# Patient Record
Sex: FEMALE | Race: WHITE
[De-identification: names, ages, dates, MRNs, and addresses within clinical notes are randomized per-mention and may not be internally consistent; named-entity substitution may affect disease eponyms.]

---

## 2017-09-19 ENCOUNTER — HOSPITAL ENCOUNTER (OUTPATIENT)
Dept: HOSPITAL 80 - FIMAGING | Age: 77
End: 2017-09-19
Attending: INTERNAL MEDICINE
Payer: COMMERCIAL

## 2017-09-19 DIAGNOSIS — Z12.31: Primary | ICD-10-CM

## 2017-09-19 PROCEDURE — G0202 SCR MAMMO BI INCL CAD: HCPCS

## 2017-10-06 ENCOUNTER — HOSPITAL ENCOUNTER (OUTPATIENT)
Dept: HOSPITAL 80 - FIMAGING | Age: 77
End: 2017-10-06
Attending: INTERNAL MEDICINE
Payer: COMMERCIAL

## 2017-10-06 DIAGNOSIS — K70.30: Primary | ICD-10-CM

## 2018-04-24 ENCOUNTER — HOSPITAL ENCOUNTER (INPATIENT)
Dept: HOSPITAL 80 - F3N | Age: 78
LOS: 1 days | Discharge: HOME | DRG: 470 | End: 2018-04-25
Attending: ORTHOPAEDIC SURGERY | Admitting: ORTHOPAEDIC SURGERY
Payer: COMMERCIAL

## 2018-04-24 DIAGNOSIS — M16.11: Primary | ICD-10-CM

## 2018-04-24 DIAGNOSIS — E78.00: ICD-10-CM

## 2018-04-24 DIAGNOSIS — E03.9: ICD-10-CM

## 2018-04-24 DIAGNOSIS — Z96.611: ICD-10-CM

## 2018-04-24 DIAGNOSIS — Z96.652: ICD-10-CM

## 2018-04-24 DIAGNOSIS — Z96.642: ICD-10-CM

## 2018-04-24 PROCEDURE — C1713 ANCHOR/SCREW BN/BN,TIS/BN: HCPCS

## 2018-04-24 PROCEDURE — 0SR902Z REPLACEMENT OF RIGHT HIP JOINT WITH METAL ON POLYETHYLENE SYNTHETIC SUBSTITUTE, OPEN APPROACH: ICD-10-PCS | Performed by: ORTHOPAEDIC SURGERY

## 2018-04-24 RX ADMIN — ACETAMINOPHEN SCH: 325 TABLET ORAL at 13:37

## 2018-04-24 RX ADMIN — ACETAMINOPHEN SCH MG: 325 TABLET ORAL at 17:34

## 2018-04-24 RX ADMIN — FAMOTIDINE SCH MG: 20 TABLET, FILM COATED ORAL at 20:44

## 2018-04-24 RX ADMIN — ACETAMINOPHEN SCH MG: 325 TABLET ORAL at 23:15

## 2018-04-24 RX ADMIN — DOCUSATE SODIUM AND SENNOSIDES SCH TAB: 50; 8.6 TABLET ORAL at 20:44

## 2018-04-24 RX ADMIN — ASPIRIN SCH MG: 325 TABLET, FILM COATED ORAL at 20:50

## 2018-04-24 NOTE — POSTOPPROG
Post Op Note


Date of Operation: 04/24/18


Surgeon: Washington Denny


Assistant: Mariano


Anesthesiologist: Ian


Anesthesia: IV Sedation, Spinal


Post-op Diagnosis: Right hip severe degenerative arthritis


Procedure: Right total hip arthroplasty


Inf/Abcess present in the surg proc area at time of surgery?: No


EBL: 100-500

## 2018-04-24 NOTE — GOP
[f rep st]



                                                                OPERATIVE REPORT





DATE OF OPERATION:  04/24/2018



SURGEON:  Washington Denny MD



ASSISTANT:  SIOMARA Tan CFA.



ANESTHESIA:  A combination of Marcaine, spinal, IV sedation, and light general anesthesia.



ANESTHESIOLOGIST:  Dr. Blade Sosa.



PREOPERATIVE DIAGNOSIS:  Right hip severe degenerative arthritis.



POSTOPERATIVE DIAGNOSIS:  Right hip severe degenerative arthritis.



PROCEDURE PERFORMED:  04/24/2018, right total hip arthroplasty, cobalt chrome femoral head on highly 
cross-linked polyethylene cup liner.



FINDINGS:  





ESTIMATED BLOOD LOSS:  400 mL.   



The sponge and needle count were correct on 2 occasions.



DESCRIPTION OF PROCEDURE:  The patient was given 1 g of IV vancomycin preoperatively within 60 minute
s of surgery.  She also received IV tranexamic acid at a dose of 1000 mg.  She was placed on the oper
ating room table and given spinal anesthesia with Marcaine by Dr. Sosa.  She was then placed supi
ne and given IV sedation.  A Price catheter was not used.  She wore a ADRIENNE stocking on the nonoperativ
e leg.  Because she has lymphedema in both upper extremities, the blood pressure cuff was placed on h
er left calf.  I could not use an SCD on the left leg.  She was rolled to the left lateral decubitus 
position.  The position was secured with the pegboard table attachment.  An axillary roll was used, a
nd all pressure points were carefully padded.  She has bilateral total shoulder arthroplasties.  I wa
s very careful in positioning her and padding her shoulders.  I was careful to lock her pelvis in a r
igid vertical position.  Her perineum was isolated with plastic adhesive drapes.  Her right hip and r
ight lower extremity were prepped with ChloraPrep.  They were draped free using sterile sheets, stock
inette, and Ioban plastic drapes. 



The [employer] time-out was performed to verify the correct surgical side and site and
 the correct patient identity.  The Emblem time-out was also performed. 



I made a 5-inch straight oblique posterolateral hip skin incision.  Subcutaneous tissues were sharply
 divided, and hemostasis was obtained using electrocautery.  The fascia erik was identified and split
 along the axis of its fibers.  I then curved posteriorly and proximally, and split the fascia of glu
teus kiah and bluntly split the muscle fibers in line with their orientation.  The Charnley self-r
etaining retractor was inserted.  Her sciatic nerve was located, partially exposed, and protected thr
oughout the procedure.  The external rotators and the posterior hip capsule were divided as separate 
layers at the base of the femoral neck, tagged, and reflected posteriorly.  A smooth 8-inch Steinmann
 pin was inserted vertically into the ilium superior to the acetabulum.  An 1/8-inch drill bit was in
serted vertically into the greater trochanter and parallel to the first pin.  The distance between th
e two was measured for leg length reference.  Her femoral head was dislocated posteriorly.  Severe de
generative changes were present on the femoral head.  The femoral neck was osteotomized at the approp
riate level and inclination. 



I was careful to preserve all the posterior capsule and most of the anterior capsule.  The remnant of
 her badly damaged labrum was excised. 



I prepared the femur first.  This allowed me to  the amount of natural femoral neck anteversion.
  This, in turn, allowed me to later determine the correct amount of cup anteversion.  She had approx
imately 10 or 12 degrees of natural femoral neck anteversion.  The canal was opened laterally with a 
box chisel.  I reamed and broached sequentially up to size 3.  I used a More Accolade II stem and 
size 3 broach with high offset as a trial stem.  I was careful to lateralize adequately. 



Appropriate retractors were inserted to expose the acetabulum.  The acetabulum was reamed sequentiall
y up to 51 mm.  I selected a 52 mm Marianna Tritanium cluster hole hemispherical shell.  This was marko
ed securely into place in the proper degree of inclination and anteversion.  I used the transverse ac
etabular ligament and other acetabular bony landmarks to help me properly orient the cup.  I inserted
 30 mm and a 25 mm supplemental screws through the shell for supplemental fixation.  I also inserted 
a screw-in metal dome hole plug.  Small anterior osteophytes were removed with an osteotome and ronge
ur. 



I performed a series of trial reductions to determine length and stability.  I concluded that the siz
e 3 stem with a +4 mm neck with high offset and a 32 mm head with a flush liner gave me the proper co
mbination of appropriate length and good anterior and posterior stability. 



The 0-degree More X3 highly cross-linked polyethylene liner was inserted and tapped securely into 
place.  The Marianna Accolade II stem in size 3 with high offset was inserted press-fit and was very t
ight.  I did 1 final trial reduction and confirmed that the +4 mm neck length with a 32 mm head was t
he proper combination.  The Marianna cobalt chrome head with an outside diameter of 32 mm and neck alma rosa
gth of +4 mm was tapped securely onto the clean trunnion.  The acetabulum was irrigated, cleaned, and
 the hip was reduced 1 final time.  She had excellent anterior and posterior stability and appropriat
e lengthening.  She was a couple of millimeters short preoperatively, and I was slightly lengthening 
her. 



40 mL of the joint anesthetic cocktail was injected into the capsule, the deep musculature, and the s
ubcutaneous tissues along the skin edges.  The joint was thoroughly irrigated 1 final time with a dil
[place] Betadine solution.  Her sciatic nerve was reinspected and looked unharmed. 



The external rotators and the posterior hip capsule were repaired in separate layers with #2 FiberWir
e sutures through drill holes in the greater trochanter.  The fascia erik was closed first with two #
2 figure-of-eight FiberWire sutures, followed by a running #2 barbed Ethicon Stratafix PDO suture.  T
he subcutaneous tissues were closed with a running 0 barbed Ethicon Stratafix Monoderm suture.  The s
kin was closed with a running 3-0 barbed Ethicon Stratafix Monoderm subcuticular suture.  The skin ed
ges were reapproximated and sealed with Dermabond glue.  The wound was covered with a large piece of 
waterproof Mepilex surgical dressing.  The Mepilex sacral dressing was also applied. 



A long-leg ADRIENNE stocking and SCD were applied to her right lower extremity.  An abduction pillow was p
laced between her knees.  She was awakened from anesthesia and rolled to the supine position on her h
ostal gurAlligator.  She was taken to PACU in satisfactory condition.



COMPLICATIONS:  There were no recognized intraoperative complications.



IMPLANTS:  I used a Marianna Tritanium cluster hole acetabular shell with an outside diameter of 52 mm
.  The liner was a Marianna X3 0-degree highly cross-linked liner with an inside diameter of 32 mm.  T
he femoral component was a press-fit More high offset Accolade II stem in size 3.  The femoral hea
d was a More cobalt chrome head with a +4 mm neck length and a 32 mm outside diameter. 



Lm Valdez and Fadi Brumfield acted as surgical assistants.  Their assistance was a medical necess
ity for safe completion of the procedure.





Job #:  [id number]

## 2018-04-24 NOTE — PDANEPAE
ANE History of Present Illness





right EMILIA





ANE Past Medical History





- Cardiovascular History


Hx Hypertension: No


Hx Arrhythmias: No


Hx Chest Pain: No


Hx Coronary Artery / Peripheral Vascular Disease: No


Hx CHF / Valvular Disease: No


Hx Palpitations: No





- Pulmonary History


Hx COPD: No


Hx Asthma/Reactive Airway Disease: No


Hx Recent Upper Respiratory Infection: No


Hx Oxygen in Use at Home: No


Hx Sleep Apnea: No


Sleep Apnea Screening Result - Last Documented: Negative





- Neurologic History


Hx Cerebrovascular Accident: No


Hx Seizures: No


Hx Dementia: No





- Endocrine History


Hx Diabetes: No


Endocrine History Comment: hypothyroid





- Renal History


Hx Renal Disorders: No





- Liver History


Hx Hepatic Disorders: Yes


Hepatic History Comment: cirrhosis.  Dr Santiago orders.  spironolactone for 

improved liver function





- Neurological & Psychiatric Hx


Hx Neurological and Psychiatric Disorders: No





- Cancer History


Hx Cancer: Yes


Cancer History Comment: basal cell carcinoma removed from forhead





- Congenital Disorder History


Hx Congenital Disorders: No





- GI History


Hx Gastrointestinal Disorders: Yes


Gastrointestinal History Comment: constipation





- Other Health History


Other Health History: catarcts.  thin delicate skin bruises easily.  bilat 

upper arm lymphodema NO BP's





- Chronic Pain History


Chronic Pain: No





- Surgical History


Prior Surgeries: hernia repair with mesh





ANE Review of Systems


Review of Systems: 








- Exercise capacity


METS (RN): 4 METS





ANE Patient History





- Allergies


Allergies/Adverse Reactions: 








Penicillins Allergy (Severe, Verified 18 10:36)


 Rash


tramadol Allergy (Intermediate, Verified 18 10:36)


 NAUSEA & VOMITING








- Home Medications


Home medications: home medication list seen and reviewed


Home Medications: 








Ascorbic Acid [Vitamin C 500 mg (*)]  18 [Last Taken 18]


Atorvastatin Calcium [Lipitor 20 mg (*)]  18 [Last Taken 18]


Cholecalciferol Vit D3 [Vitamin D3 (*)]  18 [Last Taken 18]


Herbals/Supplements -Info Only  18 [Last Taken 18]


Levothyroxine [Synthroid 88 mcg (*)]  18 [Last Taken 18]


Omega-3 Fatty Acids [Fish Oil 1000 mg (*)]  18 [Last Taken 18]


Polyethylene Glycol 3350 [Miralax 17 gm (*)]  18 [Last Taken 18]


Spironolactone [Aldactone 25 MG (*)]  18 [Last Taken 18]








- Smoking Hx


Smoking Status: Never smoked





- Family Anes Hx


Family Hx Anesthesia Complications: 2 grand fathers  post anesthesia. 

unkown reasons





ANE Labs/Vital Signs





- Vital Signs


Height: 160.02 cm


Weight: 66.224 kg





ANE Physical Exam





- Airway


Neck exam: FROM


Mallampati Score: Class 2


Mouth exam: normal dental/mouth exam





- Pulmonary


Pulmonary: no respiratory distress





- Cardiovascular


Cardiovascular: regular rate and rhythym





- ASA Status


ASA Status: III





ANE Anesthesia Plan


Anesthesia Plan: spinal


Urgent/Emergent Case: Lyly thornton completed preop but documented later for safe 

timely pt care

## 2018-04-25 VITALS — SYSTOLIC BLOOD PRESSURE: 143 MMHG | DIASTOLIC BLOOD PRESSURE: 80 MMHG

## 2018-04-25 RX ADMIN — ASPIRIN SCH MG: 325 TABLET, FILM COATED ORAL at 08:15

## 2018-04-25 RX ADMIN — DOCUSATE SODIUM AND SENNOSIDES SCH TAB: 50; 8.6 TABLET ORAL at 08:15

## 2018-04-25 RX ADMIN — ACETAMINOPHEN SCH: 325 TABLET ORAL at 06:10

## 2018-04-25 RX ADMIN — FAMOTIDINE SCH MG: 20 TABLET, FILM COATED ORAL at 08:15

## 2018-04-25 NOTE — ASMTCMCOM
CM Note

 

CM Note                       

Notes:

Pt had R total hip. PT rec home/outpatient. Pt medically stable for d/c with family support. No CM 

d/c needs identified. 

 

Date Signed:  04/25/2018 11:32 AM

Electronically Signed By:NATHANIEL Lobato

## 2018-04-25 NOTE — SOAPPROG
SOAP Progress Note


Assessment/Plan: 


Assessment:





Afebrile.  Awake and alert.


Little pain.


H/H is good.


Films look good.


Sciatic nerve intact.


Has walked in [place] and done stairs.




















Plan:DC today.





04/25/18 09:45





Objective: 





 Vital Signs











Temp Pulse Resp BP Pulse Ox


 


 36.8 C   70   16   169/86 H  92 


 


 04/25/18 08:34  04/25/18 08:34  04/25/18 08:34  04/25/18 08:34  04/25/18 08:34








 Laboratory Results





 04/25/18 04:18 





 











 04/24/18 04/25/18 04/26/18





 05:59 05:59 05:59


 


Intake Total  1375 


 


Output Total  800 


 


Balance  575 














ICD10 Worksheet


Patient Problems: 


 Problems











Problem Status Onset


 


Osteoarthritis of right hip Acute

## 2018-04-25 NOTE — GDS
[f rep st]



                                                             DISCHARGE SUMMARY





ADMISSION DIAGNOSIS:  Right hip degenerative arthritis.



DISCHARGE DIAGNOSIS:  Right hip degenerative arthritis.



OPERATION PERFORMED:  04/24/2018, a right total hip arthroplasty.



POSTOPERATIVE COMPLICATIONS:  None.



CONDITION ON DISCHARGE:  Improved.



DESCRIPTION OF HOSPITAL COURSE:  The patient was admitted to the hospital the morning of surgery.  He
r admission CBC, electrolytes, BUN, and creatinine were all normal.  The same day, under a combinatio
n of Marcaine, spinal, and IV sedation, she underwent a right total hip arthroplasty.  Postoperativel
y, she was treated with multimodal DVT prophylaxis, including aspirin.  On the first postoperative da
y, her hemoglobin and hematocrit were 10.9 and 31.9.  She did not require any transfused blood.  She 
was seen by Physical Therapy and made good progress with ambulation and stairs.  By the time of disch
arge, she was afebrile, independent walking with a walker and her dressing was dry.



DISPOSITION:  Patient is discharged to her home.  She may progress to full weightbearing on the right
 as tolerated.  Abduction pillow in bed for 3 weeks.  ADRIENNE stockings for 7 days.  Continue aspirin 325
 mg p.o. daily for 21 days.  She has prescriptions for tramadol and oxycodone for pain control.  She 
has a history of elevated serum iron so she does not need to take supplemental iron at home.  I will 
see her back in the office on 05/14/2018. If any problems, she is to call me at the office.





Job #:  [id number]

## 2018-10-02 ENCOUNTER — HOSPITAL ENCOUNTER (OUTPATIENT)
Dept: HOSPITAL 80 - FIMAGING | Age: 78
End: 2018-10-02
Attending: INTERNAL MEDICINE
Payer: COMMERCIAL

## 2018-10-02 DIAGNOSIS — K70.30: Primary | ICD-10-CM
